# Patient Record
(demographics unavailable — no encounter records)

---

## 2024-10-23 NOTE — REASON FOR VISIT
[Initial Consultation] : an initial consultation for [FreeTextEntry2] : Pt referred by Dr. Brewer for consultation of RUQ abdominal pain

## 2024-10-23 NOTE — CONSULT LETTER
[Dear  ___] : Dear  [unfilled], [Consult Letter:] : I had the pleasure of evaluating your patient, [unfilled]. [Please see my note below.] : Please see my note below. [Consult Closing:] : Thank you very much for allowing me to participate in the care of this patient.  If you have any questions, please do not hesitate to contact me. [Sincerely,] : Sincerely, [FreeTextEntry3] : Negrito Queen MD, FICS, FACS, REKHA Director of Surgical Oncology- [facility] , Department of Surgery [facility] at [address]- [address]   [address] [place], [place]- [address]   (mob) [phone] (o) [phone] (f) [phone]

## 2024-10-23 NOTE — ASSESSMENT
[FreeTextEntry1] : 35 y/o female referred by Dr. Brewer for abdominal pain consultation to r/o biliary dyskinesia.   Plan:  -Symptomatic patient, repeat US of the abdomen ordered to r/o gallbladder sludge.  - MRI ordered by Dr. Brewer this thursday -Referred back to Dr. Brewer for endoscopy to rule out PUD -RTO 2 weeks.     I have discussed the diagnosis, therapeutic plan and options with the patient at length. Patient expressed verbal understanding to proceed with proposed plan. All questions answered.

## 2024-10-23 NOTE — HISTORY OF PRESENT ILLNESS
[de-identified] : 33 y/o female referred by Dr. Brewer for abdominal pain consultation to r/o biliary dyskinesia. Pt has c/o of RUQ pain for 3 weeks.  Reports nausea and aggravation with food intake. Underwent USG- no gall stones CT abd- no gall stones, no obvious path seen HIDA- normal EF, but pt express similar abdominal pain and symptoms during CCK injection Pmhx- migraines and endometriosis  SxHx- Hysterectomy/   FamHx- Kodi cancer in grandmother, prostate cancer in maternal grandfather.  Social Hx- Denies smoking, etoh or illicit drug use.   10/2024 US ADB- Negative for cholelithiasis   10/2024- CT AP- Rounded soft tissue focus in the right LQ abutting the top of the cecum is favored represent the right ovary.  10/2024- CCK- HIDA- Normal gallbladder ejection fraction.   Scheduled MRI at -

## 2024-10-23 NOTE — PHYSICAL EXAM
[Normal] : supple, no neck mass and thyroid not enlarged [Normal Neck Lymph Nodes] : normal neck lymph nodes  [Normal Supraclavicular Lymph Nodes] : normal supraclavicular lymph nodes [Normal Groin Lymph Nodes] : normal groin lymph nodes [Normal Axillary Lymph Nodes] : normal axillary lymph nodes [Normal] : oriented to person, place and time, with appropriate affect [FreeTextEntry1] :   COVID -19 precautions as per [facility] policy was universally followed.

## 2024-10-25 NOTE — ASSESSMENT
[FreeTextEntry1] : 35 y/o female referred by Dr. Brewer for abdominal pain consultation to r/o biliary dyskinesia.   10/23/2024- US- Unremarkable sonographic appearance of the gallbladder. Mild hepatic steatosis.   Plan:  -Symptomatic patient, repeat US of the abdomen ordered to r/o gallbladder sludge.  - MRI ordered by Dr. Brewer this thursday -Referred back to Dr. Brewer for endoscopy to rule out PUD -RTO 2 weeks.     I have discussed the diagnosis, therapeutic plan and options with the patient at length. Patient expressed verbal understanding to proceed with proposed plan. All questions answered.

## 2024-10-25 NOTE — PHYSICAL EXAM
[FreeTextEntry1] :   COVID -19 precautions as per [facility] policy was universally followed. [Normal] : supple, no neck mass and thyroid not enlarged [Normal Neck Lymph Nodes] : normal neck lymph nodes  [Normal Supraclavicular Lymph Nodes] : normal supraclavicular lymph nodes [Normal Groin Lymph Nodes] : normal groin lymph nodes [Normal Axillary Lymph Nodes] : normal axillary lymph nodes [Normal] : oriented to person, place and time, with appropriate affect

## 2024-10-25 NOTE — CONSULT LETTER
[Dear  ___] : Dear  [unfilled], [Consult Letter:] : I had the pleasure of evaluating your patient, [unfilled]. [Please see my note below.] : Please see my note below. [Consult Closing:] : Thank you very much for allowing me to participate in the care of this patient.  If you have any questions, please do not hesitate to contact me. [Sincerely,] : Sincerely, [FreeTextEntry3] : Negrito Queen MD, FICS, FACS, REKHA Director of Surgical Oncology- [facility] , Department of Surgery [facility] at [address]- [address]   [address] [place], [place]- [address]   (mob) [phone] (o) [phone] (f) [phone]

## 2024-10-25 NOTE — HISTORY OF PRESENT ILLNESS
[de-identified] : 33 y/o female referred by Dr. Brewer for abdominal pain consultation to r/o biliary dyskinesia. Pt has c/o of RUQ pain for 3 weeks.  Reports nausea and aggravation with food intake. Underwent USG- no gall stones CT abd- no gall stones, no obvious path seen HIDA- normal EF, but pt express similar abdominal pain and symptoms during CCK injection Pmhx- migraines and endometriosis  SxHx- Hysterectomy/   FamHx- Kodi cancer in grandmother, prostate cancer in maternal grandfather.  Social Hx- Denies smoking, etoh or illicit drug use.   10/2024 US ADB- Negative for cholelithiasis   10/2024- CT AP- Rounded soft tissue focus in the right LQ abutting the top of the cecum is favored represent the right ovary.  10/2024- CCK- HIDA- Normal gallbladder ejection fraction.   10/23/2024- US- Unremarkable sonographic appearance of the gallbladder. Mild hepatic steatosis.   Scheduled MRI at -